# Patient Record
Sex: MALE | Race: WHITE | NOT HISPANIC OR LATINO | URBAN - METROPOLITAN AREA
[De-identification: names, ages, dates, MRNs, and addresses within clinical notes are randomized per-mention and may not be internally consistent; named-entity substitution may affect disease eponyms.]

---

## 2021-09-18 ENCOUNTER — EMERGENCY (EMERGENCY)
Facility: HOSPITAL | Age: 56
LOS: 1 days | Discharge: HOME | End: 2021-09-18
Attending: EMERGENCY MEDICINE | Admitting: EMERGENCY MEDICINE
Payer: COMMERCIAL

## 2021-09-18 VITALS
TEMPERATURE: 97 F | SYSTOLIC BLOOD PRESSURE: 183 MMHG | OXYGEN SATURATION: 100 % | DIASTOLIC BLOOD PRESSURE: 74 MMHG | HEART RATE: 78 BPM | RESPIRATION RATE: 16 BRPM

## 2021-09-18 PROCEDURE — 99283 EMERGENCY DEPT VISIT LOW MDM: CPT

## 2021-09-18 RX ORDER — LORATADINE 10 MG/1
10 TABLET ORAL ONCE
Refills: 0 | Status: COMPLETED | OUTPATIENT
Start: 2021-09-18 | End: 2021-09-18

## 2021-09-18 RX ADMIN — LORATADINE 10 MILLIGRAM(S): 10 TABLET ORAL at 15:33

## 2021-09-18 NOTE — ED PROVIDER NOTE - PATIENT PORTAL LINK FT
You can access the FollowMyHealth Patient Portal offered by [facility] by registering at the following website: [email or web address]. By joining [employer]’s FollowMyHealth portal, you will also be able to view your health information using other applications (apps) compatible with our system.

## 2021-09-18 NOTE — ED PROVIDER NOTE - PHYSICAL EXAMINATION
CONSTITUTIONAL: NAD  SKIN: pt has circled multiple spots on arm and hand. No diffuse uticaria or vesicles.   HEAD: NCAT  EYES: NL inspection  ENT: MMM  NECK: Supple; non tender.  CARD: RRR  RESP: CTAB  ABD: S/NT no R/G  EXT: no pedal edema  NEURO: Grossly unremarkable  PSYCH: Cooperative, appropriate. CONSTITUTIONAL: NAD  SKIN: pt has circled multiple spots on arm and hand. No diffuse urticaria or vesicles. No streaking, no lymphadenopathy. no furrows or burrows in skin of digit.   HEAD: NCAT  EYES: NL inspection  ENT: MMM, no sore throat   NECK: Supple; non tender.  CARD: RRR  RESP: CTAB, no wheezing  ABD: S/NT no R/G  EXT: no pedal edema  NEURO: Grossly unremarkable  PSYCH: Cooperative, appropriate. AAOx4

## 2021-09-18 NOTE — ED ADULT TRIAGE NOTE - CHIEF COMPLAINT QUOTE
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

## 2021-09-18 NOTE — ED PROVIDER NOTE - NS ED ROS FT
Constitutional:  See HPI, no fever  Eyes:  No visual changes  ENMT: No neck pain or stiffness, no swellings or sore throat   Cardiac:  No chest pain  Respiratory:  No cough or respiratory distress.   GI:  No nausea, vomiting, diarrhea or abdominal pain.  :  No dysuria, frequency or burning.  Skin:  L arm skin "bumps" and areas of itching on L 3rd knuckle, L forearm flexor side and some in upper arm ; no other lesions on R side/back/torso   Except as documented in the HPI, all other systems are negative

## 2021-09-18 NOTE — ED PROVIDER NOTE - CLINICAL SUMMARY MEDICAL DECISION MAKING FREE TEXT BOX
casper/pgy1: 57y/o jhonatan with PMH of HTN presents with rash on L arm that started after working yesterday. No known new exposures, rash seem spotting w/o obvious entry pt by any bug, no broken skin, no lesions interdigienous skin. No obvious furrows. Likely exposure, no signs or sxs of systemic infection or inflammatory response. Sxs control and dc home with close f/u with his derm.

## 2021-09-18 NOTE — ED PROVIDER NOTE - ATTENDING CONTRIBUTION TO CARE
56M HTN was working as a , woke up with itchy lesions to L arm, has circled them, thoguht they were bugs, feels when he rubs them feels them "eating his veins".  Sx x 1 day.  No new detergents or irritants.  Has dog dog is OK.  No fever, no other rashes, perhaps on ankle but mostly L arm, NKA.  Does not look like hives.  abrasions noted possibly a welt?  Pt has circled a couple of freckles.  No breaks in skin.  No intertriginous or arms lesions.  Pruritis.   Pt tried benadryl.  Pt has dermatologist. 56M HTN was working as a , woke up with itchy lesions to L arm, has circled them, thoguht they were bugs, feels when he rubs them feels them "eating his veins".  Sx x 1 day.  No new detergents or irritants.  Has dog - dog is OK.  No fever, no other rashes, perhaps on ankle but mostly L arm, NKA.  Does not look like hives.  abrasions noted possibly a welt?  Pt has circled a couple of ?freckles small MP lesions to L bicep area.  No breaks in skin.  No intertriginous or arms lesions.  Pruritis.   Pt tried benadryl.  Pt has dermatologist.  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  No sign of bacterial infection or systemic allergic reaction.   VS:  unremarkable    GEN - NAD;   well appearing;   A+O x3   HEAD - NC/AT     ENT - PEERL, EOMI, mucous membranes    moist , no discharge      NECK: Neck supple, non-tender without lymphadenopathy, no masses, no JVD  PULM - CTA b/l,  symmetric breath sounds  COR -  normal heart sounds    ABD - , ND, NT, soft,  BACK - no CVA tenderness, nontender spine     EXTREMS - no edema, no deformity, warm and well perfused    SKIN - no rash    or bruising    except linear abrasion L forearm.  Scattered few MP lesions to L bicep.  No warmth or redness, no lacerations, no tenderness to lesions.  Distal hands NVT intact.   NEUROLOGIC - alert, face symmetric, speech fluent, sensation nl, motor no focal deficit.

## 2021-09-18 NOTE — ED PROVIDER NOTE - OBJECTIVE STATEMENT
57y/o M with HTN presents for eval of rash on L arm. 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 He states "there are bugs under my skin moving along my veins". Feels like they are moving under skin along veins. He has circled several areas of concern. Denied travel, wooded areas, fever, new detergents/clothes/food exposures, no new chemicals that he used (though doesn't know about other person's sink). Has a dog but no other family member has similar rash. Drinks alcohol q3/week, never had WD sxs. No other drug use.

## 2021-12-22 ENCOUNTER — EMERGENCY (EMERGENCY)
Facility: HOSPITAL | Age: 56
LOS: 1 days | Discharge: HOME | End: 2021-12-22
Attending: EMERGENCY MEDICINE
Payer: COMMERCIAL

## 2021-12-22 VITALS
HEIGHT: 67 IN | DIASTOLIC BLOOD PRESSURE: 97 MMHG | TEMPERATURE: 98 F | RESPIRATION RATE: 20 BRPM | SYSTOLIC BLOOD PRESSURE: 157 MMHG | HEART RATE: 107 BPM | WEIGHT: 195.11 LBS | OXYGEN SATURATION: 97 %

## 2021-12-22 VITALS
TEMPERATURE: 98 F | SYSTOLIC BLOOD PRESSURE: 144 MMHG | HEART RATE: 72 BPM | DIASTOLIC BLOOD PRESSURE: 94 MMHG | OXYGEN SATURATION: 98 % | RESPIRATION RATE: 19 BRPM

## 2021-12-22 LAB
ALBUMIN SERPL ELPH-MCNC: 4.4 G/DL — SIGNIFICANT CHANGE UP (ref 3.3–5)
ALP SERPL-CCNC: 84 U/L — SIGNIFICANT CHANGE UP (ref 40–120)
ALT FLD-CCNC: 17 U/L — SIGNIFICANT CHANGE UP (ref 10–45)
ANION GAP SERPL CALC-SCNC: 16 MMOL/L — SIGNIFICANT CHANGE UP (ref 5–17)
AST SERPL-CCNC: 18 U/L — SIGNIFICANT CHANGE UP (ref 10–40)
BASOPHILS # BLD AUTO: 0.05 K/UL — SIGNIFICANT CHANGE UP (ref 0–0.2)
BASOPHILS NFR BLD AUTO: 0.5 % — SIGNIFICANT CHANGE UP (ref 0–2)
BILIRUB SERPL-MCNC: 0.4 MG/DL — SIGNIFICANT CHANGE UP (ref 0.2–1.2)
BUN SERPL-MCNC: 21 MG/DL — SIGNIFICANT CHANGE UP (ref 7–23)
CALCIUM SERPL-MCNC: 9.5 MG/DL — SIGNIFICANT CHANGE UP (ref 8.4–10.5)
CHLORIDE SERPL-SCNC: 102 MMOL/L — SIGNIFICANT CHANGE UP (ref 96–108)
CO2 SERPL-SCNC: 21 MMOL/L — LOW (ref 22–31)
CREAT SERPL-MCNC: 1.32 MG/DL — HIGH (ref 0.5–1.3)
EOSINOPHIL # BLD AUTO: 0.48 K/UL — SIGNIFICANT CHANGE UP (ref 0–0.5)
EOSINOPHIL NFR BLD AUTO: 5 % — SIGNIFICANT CHANGE UP (ref 0–6)
GLUCOSE SERPL-MCNC: 130 MG/DL — HIGH (ref 70–99)
HCT VFR BLD CALC: 50.3 % — HIGH (ref 39–50)
HGB BLD-MCNC: 17.5 G/DL — HIGH (ref 13–17)
IMM GRANULOCYTES NFR BLD AUTO: 0.4 % — SIGNIFICANT CHANGE UP (ref 0–1.5)
LYMPHOCYTES # BLD AUTO: 2.39 K/UL — SIGNIFICANT CHANGE UP (ref 1–3.3)
LYMPHOCYTES # BLD AUTO: 24.7 % — SIGNIFICANT CHANGE UP (ref 13–44)
MCHC RBC-ENTMCNC: 31.3 PG — SIGNIFICANT CHANGE UP (ref 27–34)
MCHC RBC-ENTMCNC: 34.8 GM/DL — SIGNIFICANT CHANGE UP (ref 32–36)
MCV RBC AUTO: 89.8 FL — SIGNIFICANT CHANGE UP (ref 80–100)
MONOCYTES # BLD AUTO: 0.92 K/UL — HIGH (ref 0–0.9)
MONOCYTES NFR BLD AUTO: 9.5 % — SIGNIFICANT CHANGE UP (ref 2–14)
NEUTROPHILS # BLD AUTO: 5.81 K/UL — SIGNIFICANT CHANGE UP (ref 1.8–7.4)
NEUTROPHILS NFR BLD AUTO: 59.9 % — SIGNIFICANT CHANGE UP (ref 43–77)
NRBC # BLD: 0 /100 WBCS — SIGNIFICANT CHANGE UP (ref 0–0)
PLATELET # BLD AUTO: 360 K/UL — SIGNIFICANT CHANGE UP (ref 150–400)
POTASSIUM SERPL-MCNC: 4 MMOL/L — SIGNIFICANT CHANGE UP (ref 3.5–5.3)
POTASSIUM SERPL-SCNC: 4 MMOL/L — SIGNIFICANT CHANGE UP (ref 3.5–5.3)
PROT SERPL-MCNC: 7.7 G/DL — SIGNIFICANT CHANGE UP (ref 6–8.3)
RBC # BLD: 5.6 M/UL — SIGNIFICANT CHANGE UP (ref 4.2–5.8)
RBC # FLD: 11.9 % — SIGNIFICANT CHANGE UP (ref 10.3–14.5)
SARS-COV-2 RNA SPEC QL NAA+PROBE: SIGNIFICANT CHANGE UP
SODIUM SERPL-SCNC: 139 MMOL/L — SIGNIFICANT CHANGE UP (ref 135–145)
WBC # BLD: 9.69 K/UL — SIGNIFICANT CHANGE UP (ref 3.8–10.5)
WBC # FLD AUTO: 9.69 K/UL — SIGNIFICANT CHANGE UP (ref 3.8–10.5)

## 2021-12-22 PROCEDURE — 71046 X-RAY EXAM CHEST 2 VIEWS: CPT

## 2021-12-22 PROCEDURE — U0005: CPT

## 2021-12-22 PROCEDURE — 71046 X-RAY EXAM CHEST 2 VIEWS: CPT | Mod: 26

## 2021-12-22 PROCEDURE — 36415 COLL VENOUS BLD VENIPUNCTURE: CPT

## 2021-12-22 PROCEDURE — 99284 EMERGENCY DEPT VISIT MOD MDM: CPT

## 2021-12-22 PROCEDURE — 85025 COMPLETE CBC W/AUTO DIFF WBC: CPT

## 2021-12-22 PROCEDURE — 99284 EMERGENCY DEPT VISIT MOD MDM: CPT | Mod: 25

## 2021-12-22 PROCEDURE — 80053 COMPREHEN METABOLIC PANEL: CPT

## 2021-12-22 PROCEDURE — U0003: CPT

## 2021-12-22 RX ORDER — DIPHENHYDRAMINE HCL 50 MG
50 CAPSULE ORAL ONCE
Refills: 0 | Status: COMPLETED | OUTPATIENT
Start: 2021-12-22 | End: 2021-12-22

## 2021-12-22 RX ADMIN — Medication 50 MILLIGRAM(S): at 15:31

## 2021-12-22 NOTE — ED PROVIDER NOTE - OBJECTIVE STATEMENT
55yo M hx of HTN comes to ED for itchiness. Pt believes that he is infested with a parasite, says its in his spit and under his skin for 1 year. Brought in multiple sputum samples. Also complaining of some SOB, no exacerbating factors, x2 months. Denies cough, fever, cp, abdominal pain, change in urine or bowel. Family

## 2021-12-22 NOTE — ED ADULT NURSE NOTE - NSIMPLEMENTINTERV_GEN_ALL_ED
Implemented All Universal Safety Interventions:  [place] to call system. Call bell, personal items and telephone within reach. Instruct patient to call for assistance. Room bathroom lighting operational. Non-slip footwear when patient is off stretcher. Physically safe environment: no spills, clutter or unnecessary equipment. Stretcher in lowest position, wheels locked, appropriate side rails in place.

## 2021-12-22 NOTE — ED PROVIDER NOTE - ATTENDING CONTRIBUTION TO CARE
Attending MD Marin: I personally have seen and examined this patient.  Resident note reviewed and agree on plan of care and except where noted.  See below for details.     Seen in [place] [facility]    56M with PMH/PSH including HTN on Amlodipine presents to the ED with a year of itchiness and concern for parasitic infection.  Reports has been seen by multiple doctors and has had other Emergency Department visits for same.  Reports that he has been referred to behavioral health but would like to see infectious disease.  Denies travel, hiking, spelunking, drinking from rivers.  Reports seeing things in his sputum.  Reports concerned for pinworm.  Reports has tried the tape test but not first thing in the morning or overnight.  Patient brought tape but reports done before presenting to the Emergency Department.  Reports no one else in the family has symptoms.  Reports possible weight loss over the last year, ~10 lbs.  Denies chest pain.  Reports occasional shortness of breath.  Reports concerned that he has parasites in his sputum.  Denies abdominal pain, nausea, vomiting, diarrhea, bloody or black stools.  Denies urinary complaints.  A ten (10) point review of systems was negative other than as stated in the HPI or elsewhere in the chart.     Exam:   General: NAD  HENT: head NCAT, airway patent with moist mucous membranes, no pharyngeal erythema  Eyes: PERRL, no conjunctival injection   Lungs: lungs CTAB with good inspiratory effort, no wheezing, no rhonchi, no rales  Cardiac: +S1S2, no m/r/g  GI: abdomen soft with +BS, NT, ND  : no CVAT  MSK: FROM at neck, no tenderness to midline palpation, no stepoffs along length of spine  Neuro: moving all extremities spontaneously, sensory grossly intact, no gross neuro deficits  Psych: normal mood and affect   Skin: no interdigit rash, no signs of rash at exposed skin, ears blanching with palpation (no noted abnormality), no external skin abnormalities at anus    Other: samples of reported sputum examined with no clear organisms seen    A/P: 56M with concern for parasite, will check labs for metabolic derangement, will check CXR for infectious process given report of occasional shortness of breath, explained that would likely need to follow up with ID/GI for possible stool testing (was unable to produce stool sample in Emergency Department), explained if concerned could be referred to pulm if concern of bronchial infection, amenable.   Also explained that tape test for Enterobius vermicularis should be performed first thing in AM or overnight. Attending MD Marin: I personally have seen and examined this patient.  Resident note reviewed and agree on plan of care and except where noted.  See below for details.     Seen in [place] [facility]    56M with PMH/PSH including HTN on Amlodipine presents to the ED with a year of itchiness and concern for parasitic infection.  Reports has been seen by multiple doctors and has had other Emergency Department visits for same.  Reports that he has been referred to behavioral health but would like to see infectious disease.  Denies travel, hiking, spelunking, drinking from rivers.  Reports seeing things in his sputum.  Reports concerned for pinworm.  Reports has tried the tape test but not first thing in the morning or overnight.  Patient brought tape but reports done before presenting to the Emergency Department.  Reports no one else in the family has symptoms.  Reports possible weight loss over the last year, ~10 lbs.  Denies chest pain.  Reports occasional shortness of breath.  Reports concerned that he has parasites in his sputum.  Denies abdominal pain, nausea, vomiting, diarrhea, bloody or black stools.  Denies urinary complaints.  A ten (10) point review of systems was negative other than as stated in the HPI or elsewhere in the chart.     Exam:   General: NAD  HENT: head NCAT, airway patent with moist mucous membranes, no pharyngeal erythema  Eyes: PERRL, no conjunctival injection   Lungs: lungs CTAB with good inspiratory effort, no wheezing, no rhonchi, no rales  Cardiac: +S1S2, no m/r/g  GI: abdomen soft with +BS, NT, ND  : no CVAT  MSK: FROM at neck, no tenderness to midline palpation, no stepoffs along length of spine  Neuro: moving all extremities spontaneously, sensory grossly intact, no gross neuro deficits  Psych: normal mood and affect   Skin: no interdigit rash, no signs of rash at exposed skin, ears blanching with palpation (no noted abnormality), no external skin abnormalities at anus    Other: samples of reported sputum examined with no clear organisms seen    A/P: 56M with concern for parasite, will check labs for metabolic derangement, will check CXR for infectious process given report of occasional shortness of breath, explained that would likely need to follow up with ID/GI for possible stool testing (was unable to produce stool sample in Emergency Department), explained if concerned could be referred to pulm if concern of bronchial infection, amenable.   Also explained that tape test for Enterobius vermicularis should be performed first thing in AM or overnight.  Explained that considerations of Ekbom syndrome is usually after other causes are ruled out.  Patient very upset he was previously referred to behavioral health and adamantly denies psych history.

## 2021-12-22 NOTE — ED PROVIDER NOTE - NSFOLLOWUPCLINICS_GEN_ALL_ED_FT
[facility] - Infectious Disease  Infectious Disease  [address], [place] [address]  Phone: [phone]  Fax:   Follow Up Time: Routine    [facility] Pulmonolgy and Sleep Medicine  Pulmonology  [address], [address]  [place] [address]  Phone: [phone]  Fax:   Follow Up Time: Routine    Gastroenterology at [facility]  Gastroenterology  [address] [place] [address]  Phone: [phone]  Fax:   Follow Up Time: Routine

## 2021-12-22 NOTE — ED PROVIDER NOTE - NSFOLLOWUPINSTRUCTIONS_ED_ALL_ED_FT
YOU WERE SEEN IN THE ED FOR: concern for parasite, itching, anal itching    WHILE YOU WERE HERE, YOU HAD: blood work and chest xray     FOR PAIN OR FEVER, YOU MAY TAKE TYLENOL (Acetaminophen) AND/OR IBUPROFEN (Advil or Motrin). FOLLOW THE INSTRUCTIONS ON THE LABEL/CONTAINER.    YOUR RENAL FUNCTION WAS MINIMALLY ELEVATED, PLEASE HYDRATE ORALLY.  PLEASE FOLLOW UP WITH YOUR PRIVATE PHYSICIAN WITHIN THE NEXT 48 HOURS IN REGARDS TO THIS MATTER. BRING COPIES OF YOUR RESULTS.  YOU ARE BEING GIVEN THE NUMBER FOR THE PULMONARY/GASTROENTEROLOGY/INFECTIOUS DISEASE CLINICS.    RETURN TO THE EMERGENCY DEPARTMENT IF YOU EXPERIENCE ANY NEW/CONCERNING/WORSENING SYMPTOMS SUCH AS BUT NOT LIMITED TO: shortness of breath, chest pain, abdominal pain, blood in stools, black stools, blood in urine, or any other symptoms.

## 2021-12-22 NOTE — ED ADULT NURSE NOTE - OBJECTIVE STATEMENT
Patient  is  alert   and  oriented x3.  Color is  good and  skin warm to touch.  He  is  c/o  itchiness, SOB and   spitting up warms . He denies abdominal pain .

## 2021-12-22 NOTE — ED PROVIDER NOTE - PATIENT PORTAL LINK FT
You can access the FollowMyHealth Patient Portal offered by [facility] by registering at the following website: [email or web address]. By joining [email or web address]’s FollowMyHealth portal, you will also be able to view your health information using other applications (apps) compatible with our system.

## 2021-12-23 PROBLEM — I10 ESSENTIAL (PRIMARY) HYPERTENSION: Chronic | Status: ACTIVE | Noted: 2021-09-18

## 2025-06-04 NOTE — ED ADULT NURSE NOTE - BEFAST BALANCE
Attending and PA/NP shared services statement (NON-critical care): Attending and PA/NP shared services statement (NON-critical care): No